# Patient Record
Sex: FEMALE | Race: WHITE | Employment: UNEMPLOYED
[De-identification: names, ages, dates, MRNs, and addresses within clinical notes are randomized per-mention and may not be internally consistent; named-entity substitution may affect disease eponyms.]

---

## 2020-07-31 LAB — MAMMOGRAPHY, EXTERNAL: NORMAL

## 2022-03-18 PROBLEM — F31.9 BIPOLAR AFFECTIVE DISORDER (HCC): Status: ACTIVE | Noted: 2021-10-25

## 2022-03-18 PROBLEM — E55.9 VITAMIN D DEFICIENCY: Status: ACTIVE | Noted: 2021-11-26

## 2022-03-18 PROBLEM — F90.9 ADHD (ATTENTION DEFICIT HYPERACTIVITY DISORDER): Status: ACTIVE | Noted: 2021-10-25

## 2022-03-19 PROBLEM — R53.82 CHRONIC FATIGUE: Status: ACTIVE | Noted: 2021-11-24

## 2022-03-19 PROBLEM — F32.9 MDD (MAJOR DEPRESSIVE DISORDER): Status: ACTIVE | Noted: 2021-10-25

## 2022-03-19 PROBLEM — E78.5 HLD (HYPERLIPIDEMIA): Status: ACTIVE | Noted: 2020-08-05

## 2022-03-19 PROBLEM — Z90.710 S/P TAH (TOTAL ABDOMINAL HYSTERECTOMY): Status: ACTIVE | Noted: 2021-11-24

## 2022-03-19 PROBLEM — I10 HTN (HYPERTENSION): Status: ACTIVE | Noted: 2020-08-05

## 2022-03-19 PROBLEM — R41.3: Status: ACTIVE | Noted: 2021-11-24

## 2022-03-19 PROBLEM — K21.9 GERD (GASTROESOPHAGEAL REFLUX DISEASE): Status: ACTIVE | Noted: 2021-10-25

## 2022-03-19 PROBLEM — R00.2 INTERMITTENT PALPITATIONS: Status: ACTIVE | Noted: 2021-10-25

## 2022-03-19 PROBLEM — F41.1 GAD (GENERALIZED ANXIETY DISORDER): Status: ACTIVE | Noted: 2020-08-05

## 2022-03-20 PROBLEM — Z00.00 ANNUAL PHYSICAL EXAM: Status: ACTIVE | Noted: 2021-11-24

## 2022-03-20 PROBLEM — K58.9 IBS (IRRITABLE BOWEL SYNDROME): Status: ACTIVE | Noted: 2021-10-25

## 2022-05-19 DIAGNOSIS — R73.02 GLUCOSE INTOLERANCE (IMPAIRED GLUCOSE TOLERANCE): ICD-10-CM

## 2022-05-19 DIAGNOSIS — I10 HYPERTENSION, UNSPECIFIED TYPE: Primary | ICD-10-CM

## 2022-05-25 ENCOUNTER — TELEMEDICINE (OUTPATIENT)
Dept: FAMILY MEDICINE CLINIC | Facility: CLINIC | Age: 52
End: 2022-05-25
Payer: MEDICARE

## 2022-05-25 DIAGNOSIS — R00.2 INTERMITTENT PALPITATIONS: ICD-10-CM

## 2022-05-25 DIAGNOSIS — I10 HYPERTENSION, UNSPECIFIED TYPE: Primary | ICD-10-CM

## 2022-05-25 DIAGNOSIS — K58.1 IRRITABLE BOWEL SYNDROME WITH CONSTIPATION: ICD-10-CM

## 2022-05-25 DIAGNOSIS — R73.02 IGT (IMPAIRED GLUCOSE TOLERANCE): ICD-10-CM

## 2022-05-25 DIAGNOSIS — F31.9 BIPOLAR AFFECTIVE DISORDER, REMISSION STATUS UNSPECIFIED (HCC): ICD-10-CM

## 2022-05-25 DIAGNOSIS — Z00.00 ANNUAL PHYSICAL EXAM: ICD-10-CM

## 2022-05-25 DIAGNOSIS — E55.9 VITAMIN D DEFICIENCY: ICD-10-CM

## 2022-05-25 PROCEDURE — 99214 OFFICE O/P EST MOD 30 MIN: CPT | Performed by: FAMILY MEDICINE

## 2022-05-25 PROCEDURE — G8427 DOCREV CUR MEDS BY ELIG CLIN: HCPCS | Performed by: FAMILY MEDICINE

## 2022-05-25 PROCEDURE — 3017F COLORECTAL CA SCREEN DOC REV: CPT | Performed by: FAMILY MEDICINE

## 2022-05-25 RX ORDER — ALBUTEROL SULFATE 90 UG/1
2 AEROSOL, METERED RESPIRATORY (INHALATION) EVERY 4 HOURS PRN
Qty: 18 G | Refills: 5 | Status: CANCELLED | OUTPATIENT
Start: 2022-05-25 | End: 2023-05-25

## 2022-05-25 RX ORDER — VALSARTAN AND HYDROCHLOROTHIAZIDE 80; 12.5 MG/1; MG/1
1 TABLET, FILM COATED ORAL DAILY
Qty: 90 TABLET | Refills: 1 | Status: SHIPPED | OUTPATIENT
Start: 2022-05-25

## 2022-05-25 RX ORDER — CARVEDILOL 25 MG/1
25 TABLET ORAL 2 TIMES DAILY WITH MEALS
Qty: 180 TABLET | Refills: 1 | Status: SHIPPED | OUTPATIENT
Start: 2022-05-25

## 2022-05-25 RX ORDER — ERGOCALCIFEROL (VITAMIN D2) 1250 MCG
50000 CAPSULE ORAL
Qty: 26 CAPSULE | Refills: 1 | Status: SHIPPED | OUTPATIENT
Start: 2022-05-26

## 2022-05-25 NOTE — ASSESSMENT & PLAN NOTE
Problem and/or Symptoms are currently not stable and/or well controlled on current treatment plan. Will have patient follow up as directed and make the following changes for further evaluation and/or treatment:     For glycemic control & to help w/weight loss goals, will start Jardiance. Discussed w/pt that this med can increase incidence of UTI's and/or vaginal yeast infections.

## 2022-05-25 NOTE — ASSESSMENT & PLAN NOTE
Problem and/or Symptoms are currently stable and/or improving on current treatment plan. Will continue and have patient follow up as directed.     Pertinent labs reviewed & pertinent meds refilled X 6 M

## 2022-05-25 NOTE — ASSESSMENT & PLAN NOTE
Problem and/or Symptoms are currently not stable and/or well controlled on current treatment plan.  Will have patient follow up as directed and make the following changes for further evaluation and/or treatment:     Increasing Vit D regimen to twice weekly from once weekly

## 2022-05-25 NOTE — ASSESSMENT & PLAN NOTE
Problem is managed by appropriate specialist and patient encouraged to attend all scheduled visits and take all medications as directed.

## 2022-05-25 NOTE — PATIENT INSTRUCTIONS
Continue taking all the medications on this list as directed; this list is current and please discontinue any medications not on this list. Please call the office or use \"My Chart\" online to request medication refills prior to running out. It was a pleasure to see and care for you this visit; I look forward to seeing you next time. Also please remember that we keep \"Urgent Care\" visit slots in reserve every day for any acute illness or injury. If you ever have an urgent issue please call our office and we should typically be able to see you within 24 hours, but most often you will be able to be seen the same day that you call. We also offer \"Virtual Visits\" that can be done over a video connection, or regular phone call if you don't have a video connection.

## 2022-05-25 NOTE — PROGRESS NOTES
[address], [address] [place] [address]  [phone]      Encounter [facility]; Established patient 46 y. o.female; seen 5/25/2022 for:  Hypertension, Irritable Bowel Syndrome, and Weight Gain (would like discuss being unable to lose weight)      Assessment & Plan    1. Hypertension, unspecified type  Assessment & Plan:  Problem and/or Symptoms are currently stable and/or improving on current treatment plan. Will continue and have patient follow up as directed. Pertinent labs reviewed & pertinent meds refilled X 6 M  Orders:  -     carvedilol (COREG) 25 MG tablet; Take 1 tablet by mouth 2 times daily (with meals), Disp-180 tablet, R-1Normal  -     valsartan-hydroCHLOROthiazide (DIOVAN-HCT) 80-12.5 MG per tablet; Take 1 tablet by mouth daily, Disp-90 tablet, R-1Normal  -     Comprehensive Metabolic Panel; Future  -     T4, Free; Future  -     TSH; Future  2. Irritable bowel syndrome with constipation  Assessment & Plan:  Problem and/or Symptoms are currently stable and/or improving on current treatment plan. Will continue and have patient follow up as directed. Pertinent labs reviewed & pertinent meds refilled X 6 M    Orders:  -     linaclotide (LINZESS) 290 MCG CAPS capsule; Take 1 capsule by mouth every morning (before breakfast), Disp-90 capsule, R-1Normal  -     T4, Free; Future  -     TSH; Future  3. IGT (impaired glucose tolerance)  Assessment & Plan:  Problem and/or Symptoms are currently not stable and/or well controlled on current treatment plan. Will have patient follow up as directed and make the following changes for further evaluation and/or treatment:     For glycemic control & to help w/weight loss goals, will start Jardiance. Discussed w/pt that this med can increase incidence of UTI's and/or vaginal yeast infections. Orders:  -     empagliflozin (JARDIANCE) 25 MG tablet;  Take 1 tablet by mouth daily (with breakfast), Disp-90 tablet, R-1Normal  - Comprehensive Metabolic Panel; Future  -     T4, Free; Future  -     TSH; Future  -     Hemoglobin A1C; Future  4. Vitamin D deficiency  Assessment & Plan:  Problem and/or Symptoms are currently not stable and/or well controlled on current treatment plan. Will have patient follow up as directed and make the following changes for further evaluation and/or treatment:     Increasing Vit D regimen to twice weekly from once weekly  Orders:  -     ergocalciferol (ERGOCALCIFEROL) 1.25 MG (85110 UT) capsule; Take 1 capsule by mouth Twice a Week, Disp-26 capsule, R-1Normal  -     Vitamin D 25 Hydroxy; Future  5. Annual physical exam  -     Comprehensive Metabolic Panel; Future  -     Lipid Panel; Future  -     T4, Free; Future  -     TSH; Future  6. Bipolar affective disorder, remission status unspecified ([facility]  Assessment & Plan:  Problem is managed by appropriate specialist and patient encouraged to attend all scheduled visits and take all medications as directed. Orders:  -     CBC with Auto Differential; Future  -     T4, Free; Future  -     TSH; Future  7. Intermittent palpitations  Assessment & Plan:  Problem and/or Symptoms are currently stable and/or improving on current treatment plan. Will continue and have patient follow up as directed. Pertinent labs reviewed & pertinent meds refilled X 6 M    Orders:  -     carvedilol (COREG) 25 MG tablet; Take 1 tablet by mouth 2 times daily (with meals), Disp-180 tablet, R-1Normal        Return in about 6 months (around 11/25/2022) for Office Visit, DEJAN/CALEB, With Previsit Labs. Subjective & Objective    HPI  Patient states that chronic health problems are stable on current regimens and has no acute concerns today except as mentioned. Main concern today is difficulty losing weight. Used to be on Adipex which helped with weight loss, but is also on multiple Psych meds currently & has IBS. Review of Systems   Physical Exam  No flowsheet data found.   BP Readings from Last 3 Encounters:   05/18/22 110/73   04/04/22 123/86   01/10/22 130/75     Wt Readings from Last 3 Encounters:   05/18/22 173 lb (78.5 kg)   04/04/22 172 lb 3.2 oz (78.1 kg)   11/24/21 174 lb (78.9 kg)     [address] is being evaluated by a Virtual Visit (video and/or phone) encounter to address concerns as mentioned above. A caregiver was present when appropriate. Due to this being a TeleHealth encounter ([facility]), evaluation of the following organ systems was limited: Vitals/Constitutional/EENT/Resp/CV/GI//MS/Neuro/Skin/Heme-Lymph-Imm. Pursuant to the emergency declaration under the [address] [place] [address], [address] [place] [address] authority and the [employer] Act, this Virtual Visit was conducted with patient's (and/or legal guardian's) consent, to reduce the patient's risk of exposure to COVID-19 and provide necessary medical care. The patient (and/or legal guardian) is aware this is a billable service, including applicable co-pays. The patient (and/or legal guardian) has also been advised to contact this office for worsening conditions or problems, and seek emergency medical treatment and/or call 911 if deemed necessary. Patient identification was verified at the start of the visit: YES  Virtual Visit was conducted with patient (and/or legal guardian) consent: YES    Services were provided through a virtual synchronous discussion to substitute for in-person clinic visit. Provider located in office & patient located in state where provider was licensed. An electronic signature was used to authenticate this note.   -- Jarrod Salgado MD

## 2022-06-23 ENCOUNTER — NURSE TRIAGE (OUTPATIENT)
Dept: OTHER | Facility: CLINIC | Age: 52
End: 2022-06-23

## 2022-06-23 NOTE — TELEPHONE ENCOUNTER
Received call from [facility] with Red Flag Complaint. Subjective: Caller states \"Doctor was put on jardiance. Ever since starting to take it, blood sugar have been within range but experiencing some dizziness. \"     Current Symptoms: intermittent moderate dizziness/vertigo, headache, chills, /90, both legs mild numbness/tingling/mild weakness; double vision intermittent     Onset: 1 day ago; sudden; improving    Associated Symptoms: reduced activity    Pain Severity: 6/10; aching, throbbing; constant    Temperature: Denies      What has been tried: pain medications, helps but then comes back    LMP: NA Pregnant: NA    Recommended disposition: Go to ED/UCC Now (Or to Office with PCP Approval)    Care advice provided, patient verbalizes understanding; denies any other questions or concerns; instructed to call back for any new or worsening symptoms. Patient/caller is getting in touch with someone who could take patient to ED. Writer will call back to make sure patient is able to get to ED for evaluation. Attention Provider: Thank you for allowing me to participate in the care of your patient. The patient was connected to triage in response to information provided to the ECC/PSC. Please do not respond through this encounter as the response is not directed to a shared pool.       Reason for Disposition   Loss of vision or double vision (Exception: similar to previous migraines)    Protocols used: DIZZINESS - VERTIGO-ADULT-AH

## 2022-06-23 NOTE — TELEPHONE ENCOUNTER
Attempted to call back to inquire if able to get transportation to ED per patient request.     Left detailed message to call back with issues/questions

## 2022-08-01 ENCOUNTER — NURSE TRIAGE (OUTPATIENT)
Dept: OTHER | Facility: CLINIC | Age: 52
End: 2022-08-01

## 2022-08-01 NOTE — TELEPHONE ENCOUNTER
Received call from Carter Jiménez at [facility] with Red Flag Complaint. Subjective: Caller states \"abd pain \"     Current Symptoms: abd pain  intermittent, nausea , no dizziness vomitied 1 time, had diarrhea , had 1 stool that was black, did take peptobismal no pain currently  usually occurs at night, covid test is negative, hx htn and prediabetic, also c/o incontinence of urine    Onset: 1 week ago;     Associated Symptoms: NA    Pain Severity: 0/10; N/A; none    Temperature: none       What has been tried: petobismal    LMP: NA Pregnant: NA    Recommended disposition: See in Office Today or Tomorrow    Care advice provided, patient verbalizes understanding; denies any other questions or concerns; instructed to call back for any new or worsening symptoms. Patient/Caller agrees with recommended disposition; writer provided warm transfer to [facility] for appointment scheduling     Attention Provider: Thank you for allowing me to participate in the care of your patient. The patient was connected to triage in response to information provided to the ECC/PSC. Please do not respond through this encounter as the response is not directed to a shared pool.          Reason for Disposition   MILD pain (e.g., does not interfere with normal activities) and pain comes and goes (cramps) lasts > 48 hours (Exception: this same abdominal pain is a chronic symptom recurrent or ongoing AND present > 4 weeks)    Protocols used: Abdominal Pain - Female-ADULT-[place]

## 2023-01-19 ENCOUNTER — TELEMEDICINE (OUTPATIENT)
Dept: FAMILY MEDICINE CLINIC | Facility: CLINIC | Age: 53
End: 2023-01-19
Payer: MEDICARE

## 2023-01-19 DIAGNOSIS — K58.1 IRRITABLE BOWEL SYNDROME WITH CONSTIPATION: ICD-10-CM

## 2023-01-19 DIAGNOSIS — E55.9 VITAMIN D DEFICIENCY: ICD-10-CM

## 2023-01-19 DIAGNOSIS — I10 HYPERTENSION, UNSPECIFIED TYPE: Primary | ICD-10-CM

## 2023-01-19 DIAGNOSIS — K21.9 GASTROESOPHAGEAL REFLUX DISEASE, UNSPECIFIED WHETHER ESOPHAGITIS PRESENT: ICD-10-CM

## 2023-01-19 DIAGNOSIS — E78.5 HYPERLIPIDEMIA, UNSPECIFIED HYPERLIPIDEMIA TYPE: ICD-10-CM

## 2023-01-19 DIAGNOSIS — R00.2 INTERMITTENT PALPITATIONS: ICD-10-CM

## 2023-01-19 DIAGNOSIS — R73.02 IGT (IMPAIRED GLUCOSE TOLERANCE): ICD-10-CM

## 2023-01-19 PROCEDURE — 99213 OFFICE O/P EST LOW 20 MIN: CPT | Performed by: FAMILY MEDICINE

## 2023-01-19 PROCEDURE — 3017F COLORECTAL CA SCREEN DOC REV: CPT | Performed by: FAMILY MEDICINE

## 2023-01-19 PROCEDURE — G8427 DOCREV CUR MEDS BY ELIG CLIN: HCPCS | Performed by: FAMILY MEDICINE

## 2023-01-19 SDOH — ECONOMIC STABILITY: FOOD INSECURITY: WITHIN THE PAST 12 MONTHS, THE FOOD YOU BOUGHT JUST DIDN'T LAST AND YOU DIDN'T HAVE MONEY TO GET MORE.: NEVER TRUE

## 2023-01-19 SDOH — ECONOMIC STABILITY: FOOD INSECURITY: WITHIN THE PAST 12 MONTHS, YOU WORRIED THAT YOUR FOOD WOULD RUN OUT BEFORE YOU GOT MONEY TO BUY MORE.: NEVER TRUE

## 2023-01-19 ASSESSMENT — SOCIAL DETERMINANTS OF HEALTH (SDOH): HOW HARD IS IT FOR YOU TO PAY FOR THE VERY BASICS LIKE FOOD, HOUSING, MEDICAL CARE, AND HEATING?: NOT HARD AT ALL

## 2023-01-19 NOTE — PROGRESS NOTES
Nikolai  [address] [place], [place] [address], [address] [place] [address]  Phone: [phone]  Fax:[email or web address] [phone] [address] [place] [address]; Established patient 46 y. o.female; seen 1/19/2023 for: Diabetes, Hypertension, and Discuss Medications (Wants a order of D2)      Assessment & Plan    1. Hypertension, unspecified type  2. Hyperlipidemia, unspecified hyperlipidemia type  3. IGT (impaired glucose tolerance)  4. Irritable bowel syndrome with constipation  5. Gastroesophageal reflux disease, unspecified whether esophagitis present  6. Intermittent palpitations  7. Vitamin D deficiency    Discussed with pt the importance of seeing her in person as she is long overdue for vitals check and pertinent labs. Scheduling her to come in tomorrow morning to be seen ASAP and review vitals, her meds, and order pertinent labs & med RF's. Check Out Instructions  Return in about 1 day (around 1/20/2023) for for 8:40 appointment; pt NTBS ASAP. Subjective & Objective    HPI  Pt is unsure of what meds she's been taking & what she's been out of. She was given a 10 M supply of several meds for her HTN, IBS, & IGT from our office about 9 M ago; all her other meds are managed by her Psychiatrist. Pt also doesn't know what her BP or HR have been. Review of Systems     Physical Exam  Patient-Reported Vitals 1/19/2023   Patient-Reported Weight -   Patient-Reported Systolic 141   Patient-Reported Diastolic 75   Patient-Reported Pulse -       BP Readings from Last 3 Encounters:   05/18/22 110/73   04/04/22 123/86   01/10/22 130/75     Wt Readings from Last 3 Encounters:   05/18/22 173 lb (78.5 kg)   04/04/22 172 lb 3.2 oz (78.1 kg)   11/24/21 174 lb (78.9 kg)     There is no height or weight on file to calculate BMI.      PHQ-9  8/4/2022   Little interest or pleasure in doing things 0   Little interest or pleasure in doing things -   Feeling down, depressed, or hopeless 0   PHQ-2 Score 0 Total Score PHQ 2 -   PHQ-9 Total Score 0        We discussed the typical prognosis and potential complications of the concern(s), including treatment options. Medication risks, benefits, costs, interactions, and alternatives were discussed as appropriate. I advised her to contact the office if her condition worsens or fails to improve as anticipated. She expressed understanding with the discussion and plan of care. Raphael Salazar, was evaluated through a synchronous (real-time) audio and/or video encounter. The patient (or guardian if applicable) is aware that this is a billable service, which includes applicable co-pays. This Virtual Visit was conducted with patient's (and/or legal guardian's) consent. The visit was conducted pursuant to the emergency declaration under the [facility], [address] [place] [address] [facility] authority and the [employer] Act. Patient identification was verified, and a caregiver was present when appropriate. The patient was located at Home: [address] [place] [address] [id number] [facility] [id number]. Provider was located at [facility] (Appt Dept): [id number] [address]. An electronic signature was used to authenticate this note.   -- Jay Mcclain MD

## 2023-01-19 NOTE — PATIENT INSTRUCTIONS
Continue taking all the medications on this list as directed; this list is current and please discontinue any medications not on this list. Please call the office or use \"My Chart\" online to request medication refills prior to running out. Please know that we keep Urgent Care visit slots in reserve every day for any acute illness or injury. If you ever have an urgent issue please call our office and we should typically be able to see you within 24 hours, but most often you will be able to be seen the same day that you call. We also offer Virtual Visits that can be done over a video connection, or regular phone call if you don't have a video connection, if that is your preference vs an office visit. Finally, please make sure you are scheduling your visits with someone who works at our office, [facility], and not scheduling with our \"call center\". Whenever you call our office you will likely be routed to them first; all you should need to do is request to be transferred to our front office staff & they should do that for you. Please try to avoid scheduling any visits through our call center if at all possible.

## 2023-02-22 ENCOUNTER — OFFICE VISIT (OUTPATIENT)
Dept: FAMILY MEDICINE CLINIC | Facility: CLINIC | Age: 53
End: 2023-02-22
Payer: MEDICARE

## 2023-02-22 VITALS
DIASTOLIC BLOOD PRESSURE: 65 MMHG | SYSTOLIC BLOOD PRESSURE: 115 MMHG | HEART RATE: 94 BPM | HEIGHT: 65 IN | TEMPERATURE: 99.1 F | BODY MASS INDEX: 27.02 KG/M2 | WEIGHT: 162.2 LBS

## 2023-02-22 DIAGNOSIS — I10 HYPERTENSION, UNSPECIFIED TYPE: Primary | ICD-10-CM

## 2023-02-22 DIAGNOSIS — R00.2 INTERMITTENT PALPITATIONS: ICD-10-CM

## 2023-02-22 DIAGNOSIS — K58.1 IRRITABLE BOWEL SYNDROME WITH CONSTIPATION: ICD-10-CM

## 2023-02-22 DIAGNOSIS — Z00.00 ANNUAL PHYSICAL EXAM: ICD-10-CM

## 2023-02-22 DIAGNOSIS — E55.9 VITAMIN D DEFICIENCY: ICD-10-CM

## 2023-02-22 DIAGNOSIS — E78.5 HYPERLIPIDEMIA, UNSPECIFIED HYPERLIPIDEMIA TYPE: ICD-10-CM

## 2023-02-22 DIAGNOSIS — R73.02 IGT (IMPAIRED GLUCOSE TOLERANCE): ICD-10-CM

## 2023-02-22 DIAGNOSIS — L40.9 PSORIASIS OF SCALP: ICD-10-CM

## 2023-02-22 DIAGNOSIS — F31.9 BIPOLAR AFFECTIVE DISORDER, REMISSION STATUS UNSPECIFIED (HCC): ICD-10-CM

## 2023-02-22 LAB
ALBUMIN SERPL-MCNC: 3.8 G/DL (ref 3.5–5)
ALBUMIN/GLOB SERPL: 1 (ref 0.4–1.6)
ALP SERPL-CCNC: 102 U/L (ref 50–136)
ALT SERPL-CCNC: 27 U/L (ref 12–65)
ANION GAP SERPL CALC-SCNC: 4 MMOL/L (ref 2–11)
AST SERPL-CCNC: 31 U/L (ref 15–37)
BASOPHILS # BLD: 0 K/UL (ref 0–0.2)
BASOPHILS NFR BLD: 1 % (ref 0–2)
BILIRUB SERPL-MCNC: 0.5 MG/DL (ref 0.2–1.1)
BUN SERPL-MCNC: 12 MG/DL (ref 6–23)
CALCIUM SERPL-MCNC: 9.4 MG/DL (ref 8.3–10.4)
CHLORIDE SERPL-SCNC: 106 MMOL/L (ref 101–110)
CHOLEST SERPL-MCNC: 353 MG/DL
CO2 SERPL-SCNC: 28 MMOL/L (ref 21–32)
CREAT SERPL-MCNC: 0.9 MG/DL (ref 0.6–1)
DIFFERENTIAL METHOD BLD: ABNORMAL
EOSINOPHIL # BLD: 0.1 K/UL (ref 0–0.8)
EOSINOPHIL NFR BLD: 2 % (ref 0.5–7.8)
ERYTHROCYTE [DISTWIDTH] IN BLOOD BY AUTOMATED COUNT: 12.6 % (ref 11.9–14.6)
GLOBULIN SER CALC-MCNC: 3.9 G/DL (ref 2.8–4.5)
GLUCOSE SERPL-MCNC: 107 MG/DL (ref 65–100)
HCT VFR BLD AUTO: 47.8 % (ref 35.8–46.3)
HDLC SERPL-MCNC: 47 MG/DL (ref 40–60)
HDLC SERPL: 7.5
HGB BLD-MCNC: 15.6 G/DL (ref 11.7–15.4)
IMM GRANULOCYTES # BLD AUTO: 0 K/UL (ref 0–0.5)
IMM GRANULOCYTES NFR BLD AUTO: 0 % (ref 0–5)
LDLC SERPL CALC-MCNC: ABNORMAL MG/DL
LYMPHOCYTES # BLD: 2 K/UL (ref 0.5–4.6)
LYMPHOCYTES NFR BLD: 30 % (ref 13–44)
MCH RBC QN AUTO: 32.6 PG (ref 26.1–32.9)
MCHC RBC AUTO-ENTMCNC: 32.6 G/DL (ref 31.4–35)
MCV RBC AUTO: 99.8 FL (ref 82–102)
MONOCYTES # BLD: 0.5 K/UL (ref 0.1–1.3)
MONOCYTES NFR BLD: 7 % (ref 4–12)
NEUTS SEG # BLD: 4 K/UL (ref 1.7–8.2)
NEUTS SEG NFR BLD: 60 % (ref 43–78)
NRBC # BLD: 0 K/UL (ref 0–0.2)
PLATELET # BLD AUTO: 306 K/UL (ref 150–450)
PMV BLD AUTO: 9.9 FL (ref 9.4–12.3)
POTASSIUM SERPL-SCNC: 4 MMOL/L (ref 3.5–5.1)
PROT SERPL-MCNC: 7.7 G/DL (ref 6.3–8.2)
RBC # BLD AUTO: 4.79 M/UL (ref 4.05–5.2)
SODIUM SERPL-SCNC: 138 MMOL/L (ref 133–143)
T4 FREE SERPL-MCNC: 0.7 NG/DL (ref 0.78–1.46)
TRIGL SERPL-MCNC: 677 MG/DL (ref 35–150)
TSH, 3RD GENERATION: 0.87 UIU/ML (ref 0.36–3.74)
VLDLC SERPL CALC-MCNC: 135.4 MG/DL (ref 6–23)
WBC # BLD AUTO: 6.7 K/UL (ref 4.3–11.1)

## 2023-02-22 PROCEDURE — G8484 FLU IMMUNIZE NO ADMIN: HCPCS | Performed by: FAMILY MEDICINE

## 2023-02-22 PROCEDURE — G8417 CALC BMI ABV UP PARAM F/U: HCPCS | Performed by: FAMILY MEDICINE

## 2023-02-22 PROCEDURE — 3017F COLORECTAL CA SCREEN DOC REV: CPT | Performed by: FAMILY MEDICINE

## 2023-02-22 PROCEDURE — 1036F TOBACCO NON-USER: CPT | Performed by: FAMILY MEDICINE

## 2023-02-22 PROCEDURE — 3078F DIAST BP <80 MM HG: CPT | Performed by: FAMILY MEDICINE

## 2023-02-22 PROCEDURE — G8427 DOCREV CUR MEDS BY ELIG CLIN: HCPCS | Performed by: FAMILY MEDICINE

## 2023-02-22 PROCEDURE — 3074F SYST BP LT 130 MM HG: CPT | Performed by: FAMILY MEDICINE

## 2023-02-22 PROCEDURE — 99214 OFFICE O/P EST MOD 30 MIN: CPT | Performed by: FAMILY MEDICINE

## 2023-02-22 RX ORDER — ERGOCALCIFEROL 1.25 MG/1
50000 CAPSULE ORAL
Qty: 26 CAPSULE | Refills: 1 | Status: SHIPPED | OUTPATIENT
Start: 2023-02-23

## 2023-02-22 RX ORDER — VALSARTAN AND HYDROCHLOROTHIAZIDE 80; 12.5 MG/1; MG/1
1 TABLET, FILM COATED ORAL DAILY
Qty: 90 TABLET | Refills: 1 | Status: SHIPPED | OUTPATIENT
Start: 2023-02-22

## 2023-02-22 RX ORDER — CARVEDILOL 25 MG/1
25 TABLET ORAL 2 TIMES DAILY WITH MEALS
Qty: 180 TABLET | Refills: 1 | Status: SHIPPED | OUTPATIENT
Start: 2023-02-22

## 2023-02-22 RX ORDER — CLOBETASOL PROPIONATE 0.05 G/100ML
SHAMPOO TOPICAL
Qty: 118 ML | Refills: 5 | Status: SHIPPED | OUTPATIENT
Start: 2023-02-22

## 2023-02-22 SDOH — ECONOMIC STABILITY: HOUSING INSECURITY
IN THE LAST 12 MONTHS, WAS THERE A TIME WHEN YOU DID NOT HAVE A STEADY PLACE TO SLEEP OR SLEPT IN A SHELTER (INCLUDING NOW)?: NO

## 2023-02-22 SDOH — ECONOMIC STABILITY: FOOD INSECURITY: WITHIN THE PAST 12 MONTHS, THE FOOD YOU BOUGHT JUST DIDN'T LAST AND YOU DIDN'T HAVE MONEY TO GET MORE.: NEVER TRUE

## 2023-02-22 SDOH — ECONOMIC STABILITY: INCOME INSECURITY: HOW HARD IS IT FOR YOU TO PAY FOR THE VERY BASICS LIKE FOOD, HOUSING, MEDICAL CARE, AND HEATING?: NOT HARD AT ALL

## 2023-02-22 SDOH — ECONOMIC STABILITY: FOOD INSECURITY: WITHIN THE PAST 12 MONTHS, YOU WORRIED THAT YOUR FOOD WOULD RUN OUT BEFORE YOU GOT MONEY TO BUY MORE.: NEVER TRUE

## 2023-02-22 NOTE — PROGRESS NOTES
Nikolai  [address] [place] [address] [facility], [address] [facility]  Phone: [phone]  Fax:[email or web address] [phone] [address] [place] [address]; Established patient 46 y. o.female; seen 2/22/2023 for: Hypertension and Blood Sugar Problem      Assessment & Plan    1. Hypertension, unspecified type  -     carvedilol (COREG) 25 MG tablet; Take 1 tablet by mouth 2 times daily (with meals), Disp-180 tablet, R-1Normal  -     valsartan-hydroCHLOROthiazide (DIOVAN-HCT) 80-12.5 MG per tablet; Take 1 tablet by mouth daily, Disp-90 tablet, R-1Normal  -     Comprehensive Metabolic Panel  -     T4, Free  -     TSH  2. Intermittent palpitations  -     carvedilol (COREG) 25 MG tablet; Take 1 tablet by mouth 2 times daily (with meals), Disp-180 tablet, R-1Normal  3. IGT (impaired glucose tolerance)  -     empagliflozin (JARDIANCE) 25 MG tablet; Take 1 tablet by mouth daily (with breakfast), Disp-90 tablet, R-1Normal  -     Comprehensive Metabolic Panel  -     T4, Free  -     TSH  -     Hemoglobin A1C  4. Vitamin D deficiency  -     ergocalciferol (ERGOCALCIFEROL) 1.25 MG (28531 UT) capsule; Take 1 capsule by mouth Twice a Week, Disp-26 capsule, R-1Normal  -     Vitamin D 25 Hydroxy  5. Irritable bowel syndrome with constipation  -     linaclotide (LINZESS) 290 MCG CAPS capsule; Take 1 capsule by mouth every morning (before breakfast), Disp-90 capsule, R-1Normal  -     T4, Free  -     TSH  6. Psoriasis of scalp  -     Clobetasol Propionate 0.05 % SHAM; Apply thin film to scalp several times per week as needed for scalp psoriasis, Disp-118 mL, R-5Normal  7. Bipolar affective disorder, remission status unspecified ([facility]  -     CBC with Auto Differential  -     T4, Free  -     TSH  8. Annual physical exam  -     Comprehensive Metabolic Panel  -     Lipid Panel  -     T4, Free  -     TSH    Problem and/or Symptoms are currently stable and/or improving on current treatment plan.   Will continue and have patient follow up as directed. Pertinent labs pending & pertinent meds refilled X 6 M      Check Out Instructions  Return in about 6 months (around 8/22/2023) for Office Visit, Previsit Labs, Also Needs Virtual AWV ASAP. Subjective & Objective    HPI  Pt here for f/u several chronic health issues, however did not get pre-visit labs done so no results available for today's visit; will get labs today & adjust Tx regimen if needed based on results. Review of Systems    Physical Exam  Vitals:    02/22/23 1345 02/22/23 1348   BP: (!) 138/92 115/65   Site: Left Upper Arm Right Upper Arm   Position: Sitting Sitting   Cuff Size: Medium Adult Medium Adult   Pulse: 94 94   Temp: 99.1 °F (37.3 °C)    Weight: 162 lb 3.2 oz (73.6 kg)    Height: 5' 5\" (1.651 m)      BP Readings from Last 3 Encounters:   02/22/23 115/65   05/18/22 110/73   04/04/22 123/86     Body mass index is 26.99 kg/m². Wt Readings from Last 3 Encounters:   02/22/23 162 lb 3.2 oz (73.6 kg)   05/18/22 173 lb (78.5 kg)   04/04/22 172 lb 3.2 oz (78.1 kg)       PHQ-9  8/4/2022   Little interest or pleasure in doing things 0   Little interest or pleasure in doing things -   Feeling down, depressed, or hopeless 0   PHQ-2 Score 0   Total Score PHQ 2 -   PHQ-9 Total Score 0        We discussed the typical prognosis and potential complications of the concern(s), including treatment options. Medication risks, benefits, costs, interactions, and alternatives were discussed as appropriate. I advised her to contact the office if her condition worsens or fails to improve as anticipated. She expressed understanding with the discussion and plan of care. An electronic signature was used to authenticate this note.   -- Sharda Coombs MD

## 2023-02-22 NOTE — PATIENT INSTRUCTIONS
I am checking one or more labs today related to your health concerns and if any results require a change in your treatment regimen you will be notified right away. Continue taking all the medications on this list as directed; this list is current and please discontinue any medications not on this list. Please call the office or use \"My Chart\" online to request medication refills prior to running out. Please know that we keep Urgent Care visit slots in reserve every day for any acute illness or injury. If you ever have an urgent issue please call our office and we should typically be able to see you within 24 hours, but most often you will be able to be seen the same day that you call. We also offer Virtual Visits that can be done over a video connection, or regular phone call if you don't have a video connection, if that is your preference vs an office visit. Finally, please make sure you are scheduling your visits with someone who works at our office, [facility], and not scheduling with our \"call center\". When you get the phone tree options, press \"Option 2\" first & then \"Option 1\". This combination should take you directly to someone at our office. Please try to avoid scheduling any visits through our call center if at all possible.

## 2023-02-23 LAB
25(OH)D3 SERPL-MCNC: 25.2 NG/ML (ref 30–100)
EST. AVERAGE GLUCOSE BLD GHB EST-MCNC: 117 MG/DL
HBA1C MFR BLD: 5.7 % (ref 4.8–5.6)
LDLC SERPL DIRECT ASSAY-MCNC: 164 MG/DL

## 2023-03-03 PROBLEM — E03.9 HYPOTHYROIDISM (ACQUIRED): Status: ACTIVE | Noted: 2023-03-03

## 2023-03-09 ENCOUNTER — TELEMEDICINE (OUTPATIENT)
Dept: FAMILY MEDICINE CLINIC | Facility: CLINIC | Age: 53
End: 2023-03-09
Payer: MEDICARE

## 2023-03-09 DIAGNOSIS — Z12.31 SCREENING MAMMOGRAM, ENCOUNTER FOR: ICD-10-CM

## 2023-03-09 DIAGNOSIS — Z00.00 ANNUAL PHYSICAL EXAM: Primary | ICD-10-CM

## 2023-03-09 PROCEDURE — G0439 PPPS, SUBSEQ VISIT: HCPCS | Performed by: FAMILY MEDICINE

## 2023-03-09 PROCEDURE — 3017F COLORECTAL CA SCREEN DOC REV: CPT | Performed by: FAMILY MEDICINE

## 2023-03-09 ASSESSMENT — PATIENT HEALTH QUESTIONNAIRE - PHQ9
2. FEELING DOWN, DEPRESSED OR HOPELESS: 0
1. LITTLE INTEREST OR PLEASURE IN DOING THINGS: 0
SUM OF ALL RESPONSES TO PHQ QUESTIONS 1-9: 0
SUM OF ALL RESPONSES TO PHQ QUESTIONS 1-9: 0
7. TROUBLE CONCENTRATING ON THINGS, SUCH AS READING THE NEWSPAPER OR WATCHING TELEVISION: 0
10. IF YOU CHECKED OFF ANY PROBLEMS, HOW DIFFICULT HAVE THESE PROBLEMS MADE IT FOR YOU TO DO YOUR WORK, TAKE CARE OF THINGS AT HOME, OR GET ALONG WITH OTHER PEOPLE: 0
6. FEELING BAD ABOUT YOURSELF - OR THAT YOU ARE A FAILURE OR HAVE LET YOURSELF OR YOUR FAMILY DOWN: 0
SUM OF ALL RESPONSES TO PHQ QUESTIONS 1-9: 0
SUM OF ALL RESPONSES TO PHQ QUESTIONS 1-9: 0
3. TROUBLE FALLING OR STAYING ASLEEP: 0
SUM OF ALL RESPONSES TO PHQ9 QUESTIONS 1 & 2: 0
5. POOR APPETITE OR OVEREATING: 0
4. FEELING TIRED OR HAVING LITTLE ENERGY: 0
8. MOVING OR SPEAKING SO SLOWLY THAT OTHER PEOPLE COULD HAVE NOTICED. OR THE OPPOSITE, BEING SO FIGETY OR RESTLESS THAT YOU HAVE BEEN MOVING AROUND A LOT MORE THAN USUAL: 0

## 2023-03-09 ASSESSMENT — LIFESTYLE VARIABLES
HOW MANY STANDARD DRINKS CONTAINING ALCOHOL DO YOU HAVE ON A TYPICAL DAY: PATIENT DOES NOT DRINK
HOW OFTEN DO YOU HAVE A DRINK CONTAINING ALCOHOL: NEVER

## 2023-03-09 NOTE — PROGRESS NOTES
Medicare Annual Wellness Visit    Frieda Goodwin is here for Medicare AWV (/)    Assessment & Plan   Annual physical exam  Assessment & Plan:  Discussed ideal body weight and encouraged regular physical activity and healthy diet. Recommended routine preventative measures such as always wearing seatbelt & home safety measures. Counseled the patient regarding the rationale for the recommended immunizations and screenings and they are current and/or updated. Reviewed personalized prevention plan which is current and/or updated and a list of screening services available to patient. Provided end of life counseling as appropriate and recommended patient discuss with family their wishes for end of life decisions if they have not already done so. Screening mammogram, encounter for  -     RODRICK DIGITAL SCREEN W OR WO CAD BILATERAL; Future    Recommendations for Preventive Services Due: see orders and patient instructions/AVS.  Recommended screening schedule for the next 5-10 years is provided to the patient in written form: see Patient Instructions/AVS.     No follow-ups on file. Subjective       Patient's complete Health Risk Assessment and screening values have been reviewed and are found in Flowsheets. The following problems were reviewed today and where indicated follow up appointments were made and/or referrals ordered. Positive Risk Factor Screenings with Interventions:       Cognitive:    Words recalled: 1 Word Recalled           Total Score Interpretation: Abnormal Mini-Cog      Interventions:  Patient declines any further evaluation or treatment           General HRA Questions:  Select all that apply: (!) Stress, Anger    Stress Interventions:  Patient declined any further interventions or treatment    Anger Interventions:  Patient declined any further interventions or treatment       Weight and Activity:  Physical Activity: Inactive    Days of Exercise per Week: 0 days    Minutes of Exercise per Session: 0 min     On average, how many days per week do you engage in moderate to strenuous exercise (like a brisk walk)?: 0 days  Have you lost any weight without trying in the past 3 months?: No       Inactivity Interventions:  Recommendations: Advised to increase physical activity       Hearing Screen:  Do you or your family notice any trouble with your hearing that hasn't been managed with hearing aids?: (!) Yes    Interventions:  Patient declines any further evaluation or treatment     Safety:  Do you have any tripping hazards - loose or unsecured carpets or rugs?: (!) Yes  Do you have either shower bars, grab bars, non-slip mats or non-slip surfaces in your shower or bathtub?: (!) No  Interventions:  Patient declined any further interventions or treatment    ADL's:   Patient reports needing help with:  Select all that apply: (!) Dressing  Select all that apply: (!) Transportation  Interventions:  Patient declined any further interventions or treatment    Advanced Directives:  Do you have a Living Will?: (!) No    Intervention:                         Objective      Patient-Reported Vitals  Patient-Reported Systolic (Top): 1.39 mmHg  Patient-Reported Weight: 162  Patient-Reported Height: 5'5\"            No Known Allergies  Prior to Visit Medications    Medication Sig Taking?  Authorizing Provider   carvedilol (COREG) 25 MG tablet Take 1 tablet by mouth 2 times daily (with meals) Yes Garth Salas MD   empagliflozin (JARDIANCE) 25 MG tablet Take 1 tablet by mouth daily (with breakfast) Yes Garth Salas MD   ergocalciferol (ERGOCALCIFEROL) 1.25 MG (16808 UT) capsule Take 1 capsule by mouth Twice a Week Yes Garth Salas MD   linaclotide Ute Boers) 290 MCG CAPS capsule Take 1 capsule by mouth every morning (before breakfast) Yes Garth Salas MD   valsartan-hydroCHLOROthiazide (DIOVAN-HCT) 80-12.5 MG per tablet Take 1 tablet by mouth daily Yes Garth Salas MD   Clobetasol Propionate 0.05 % SHAM Apply thin film to scalp several times per week as needed for scalp psoriasis Yes Getachew Nolasco MD   albuterol sulfate HFA (PROVENTIL;VENTOLIN;PROAIR) 108 (90 Base) MCG/ACT inhaler INHALE 2 PUFFS BY MOUTH EVERY 4 TO 6 HOURS AS NEEDED Yes Historical Provider, MD   DULoxetine (CYMBALTA) 30 MG extended release capsule TAKE 1 CAPSULE BY MOUTH EVERY MORNING Yes Historical Provider, MD   lamoTRIgine (LAMICTAL) 200 MG tablet TAKE 1 TABLET BY MOUTH TWICE A DAY AS DIRECTED Yes Historical Provider, MD   amphetamine-dextroamphetamine (ADDERALL) 20 MG tablet TK 1 T PO Q MORNNG AND 1/2-1 T PO Q EVENING Yes Historical Provider, MD   amphetamine-dextroamphetamine (ADDERALL) 20 MG tablet TAKE 1 TABLET BY MOUTH AT 1PM AS NEEDED Yes Historical Provider, MD   amphetamine-dextroamphetamine (ADDERALL XR) 30 MG extended release capsule TAKE 1 CAPSULE BY MOUTH EVERY MORNING (07-23-22) Yes Historical Provider, MD   amphetamine-dextroamphetamine (ADDERALL) 15 MG tablet TAKE 1 TABLET BY MOUTH AT 1 PM Yes Historical Provider, MD   clonazePAM (KLONOPIN) 2 MG tablet TAKE 1/2 TABLET BY MOUTH EVERY 6 HOURS AS NEEDED Yes Historical Provider, MD   Omega-3 Fatty Acids (FISH OIL) 1000 MG CAPS Take 2 g by mouth in the morning. Yes Historical Provider, MD   clonazePAM (KLONOPIN) 1 MG tablet Take by mouth 2 times daily. Yes Ar Automatic Reconciliation   DULoxetine (CYMBALTA) 60 MG extended release capsule Take 60 mg by mouth daily Yes Ar Automatic Reconciliation   fluvoxaMINE (LUVOX) 100 MG tablet Take by mouth every evening Yes Ar Automatic Reconciliation   gabapentin (NEURONTIN) 800 MG tablet Take by mouth 3 times daily.  Yes Ar Automatic Reconciliation   lamoTRIgine (LAMICTAL) 100 MG tablet Take 100 mg by mouth 2 times daily Yes Ar Automatic Reconciliation   QUEtiapine (SEROQUEL) 100 MG tablet TAKE 1 TABLET BY MOUTH AT BEDTIME AS NEEDED Yes Ar Automatic Reconciliation   naproxen (NAPROSYN) 500 MG tablet TAKE 1 TABLET BY MOUTH EVERY MORNING AND EVERY EVENING TAKE WITH MEALS FOR 15 DAYS  Patient not taking: Reported on 3/9/2023  Jose Roberto Milligan MD       [facility] (Including outside providers/suppliers regularly involved in providing care):   Patient Care Team:  Santo Bernard MD as PCP - General (Family Medicine)  Santo Bernard [facility]     Reviewed and updated this visit:  Tobacco  Allergies  Meds  Problems  Med Hx  Surg Hx  Soc Hx  Fam Hx           Eric Orozco, was evaluated through a synchronous (real-time) audio-video encounter. The patient (or guardian if applicable) is aware that this is a billable service, which includes applicable co-pays. This Virtual Visit was conducted with patient's (and/or legal guardian's) consent. The visit was conducted pursuant to the emergency declaration under the [address], [facility] waiver authority and the [employer] Act. Patient identification was verified, and a caregiver was present when appropriate.    The patient was located at Home: [address] [place] [address] [id number] [facility] [id number]  Provider was located at [facility] ([facility] Dept): [id number] ,  [address]        Santo Bernard MD

## 2023-03-09 NOTE — PATIENT INSTRUCTIONS
Please know that we keep Urgent Care visit slots in reserve every day for any acute illness or injury. If you ever have an urgent issue please call our office and we should typically be able to see you within 24 hours, but most often you will be able to be seen the same day that you call. We also offer Virtual Visits that can be done over a video connection, or regular phone call if you don't have a video connection, if that is your preference vs an office visit. Finally, please make sure you are scheduling your visits with someone who works at our [facility], and not scheduling with our \"call center\". When you get the phone tree options, press \"Option 2\" first & then \"Option 1\". This combination should take you directly to someone at our office. Please try to avoid scheduling any visits through our call center if at all possible.

## 2023-03-09 NOTE — ASSESSMENT & PLAN NOTE
Discussed ideal body weight and encouraged regular physical activity and healthy diet. Recommended routine preventative measures such as always wearing seatbelt & home safety measures. Counseled the patient regarding the rationale for the recommended immunizations and screenings and they are current and/or updated. Reviewed personalized prevention plan which is current and/or updated and a list of screening services available to patient. Provided end of life counseling as appropriate and recommended patient discuss with family their wishes for end of life decisions if they have not already done so. Interdisciplinary Rounds were completed on 07/09/21 for this patient. Rounds included nursing, clinical care leader, pharmacy, and case management. Plan of care discussed. See clinical pathway and/or care plan for interventions and desired outcomes.

## 2023-03-15 ENCOUNTER — PATIENT MESSAGE (OUTPATIENT)
Dept: FAMILY MEDICINE CLINIC | Facility: CLINIC | Age: 53
End: 2023-03-15

## 2023-03-15 DIAGNOSIS — E03.9 HYPOTHYROIDISM (ACQUIRED): Primary | ICD-10-CM

## 2023-03-15 DIAGNOSIS — E78.5 HYPERLIPIDEMIA, UNSPECIFIED HYPERLIPIDEMIA TYPE: ICD-10-CM

## 2023-03-15 RX ORDER — ROSUVASTATIN CALCIUM 10 MG/1
10 TABLET, COATED ORAL DAILY
Qty: 90 TABLET | Refills: 0 | Status: SHIPPED | OUTPATIENT
Start: 2023-03-15

## 2023-03-15 RX ORDER — LEVOTHYROXINE SODIUM 0.05 MG/1
50 TABLET ORAL DAILY
Qty: 90 TABLET | Refills: 0 | Status: SHIPPED | OUTPATIENT
Start: 2023-03-15

## 2023-03-15 NOTE — TELEPHONE ENCOUNTER
From: Emily Griffith  To: Dr. Eliza Garcia: 3/15/2023 12:47 PM EDT  Subject: Low Thyroid and High Cholesterol     Dr. Moo Cueva, This is [facility]. I just saw your message about my thyroids and cholesterol. Its ok if you call me in prescriptions for me to start taking for both. Just one question. Whats it mean when your thyroids are to low? Is that why I cant loose no weight? But yes if you could please call the medications in. I thank you so much. I hope you have a nice day.  Wayne Starr

## 2023-04-19 ENCOUNTER — TELEPHONE (OUTPATIENT)
Dept: FAMILY MEDICINE CLINIC | Facility: CLINIC | Age: 53
End: 2023-04-19

## 2023-04-19 NOTE — TELEPHONE ENCOUNTER
----- Message from Jose De Jesus Parada sent at 4/19/2023  8:04 AM EDT -----  Subject: Message to Provider    QUESTIONS  Information for Provider? patient missed appt for morning of 4/19 since   her ride did not show  ---------------------------------------------------------------------------  --------------  [id number] [email or web address]  [phone]; OK to leave message on voicemail  ---------------------------------------------------------------------------  --------------  SCRIPT ANSWERS  Relationship to Patient?  Self